# Patient Record
Sex: MALE | Race: BLACK OR AFRICAN AMERICAN | Employment: OTHER | URBAN - METROPOLITAN AREA
[De-identification: names, ages, dates, MRNs, and addresses within clinical notes are randomized per-mention and may not be internally consistent; named-entity substitution may affect disease eponyms.]

---

## 2019-05-07 ENCOUNTER — TELEPHONE (OUTPATIENT)
Dept: OTOLARYNGOLOGY | Facility: CLINIC | Age: 54
End: 2019-05-07

## 2019-05-07 ENCOUNTER — OFFICE VISIT (OUTPATIENT)
Dept: OTOLARYNGOLOGY | Facility: CLINIC | Age: 54
End: 2019-05-07
Payer: COMMERCIAL

## 2019-05-07 VITALS
DIASTOLIC BLOOD PRESSURE: 90 MMHG | TEMPERATURE: 98 F | WEIGHT: 170 LBS | SYSTOLIC BLOOD PRESSURE: 162 MMHG | HEIGHT: 68 IN | BODY MASS INDEX: 25.76 KG/M2

## 2019-05-07 DIAGNOSIS — J34.2 DEVIATED SEPTUM: Primary | ICD-10-CM

## 2019-05-07 PROCEDURE — 99212 OFFICE O/P EST SF 10 MIN: CPT | Performed by: OTOLARYNGOLOGY

## 2019-05-07 PROCEDURE — 99203 OFFICE O/P NEW LOW 30 MIN: CPT | Performed by: OTOLARYNGOLOGY

## 2019-05-07 RX ORDER — AMLODIPINE BESYLATE 5 MG/1
TABLET ORAL
COMMUNITY
Start: 2019-04-20

## 2019-05-07 NOTE — PROGRESS NOTES
Jerome Claire is a 46year old male. Patient presents with:  Nose Problem: swollen nostrils   Ear Problem: ringing in right ear     HPI:   He said difficulty breathing through his nose especially the left side.   The breathing difficulty occurs mainly at nig cervical. Posterior cervical. Supraclavicular.    Eyes Normal Conjunctiva - Right: Normal, Left: Normal. Pupil - Right: Normal, Left: Normal.    Ears Normal Inspection - Right: Normal, Left: Normal. Canal - Left: Normal. TM - Right: Normal, Left: Normal.

## 2019-05-10 NOTE — TELEPHONE ENCOUNTER
Patient contacted. Patient is scheduled for surgery with Dr Luz Mar on 06/20/19. Pre post op instructions reviewed.

## 2019-06-18 ENCOUNTER — TELEPHONE (OUTPATIENT)
Dept: OTOLARYNGOLOGY | Facility: CLINIC | Age: 54
End: 2019-06-18

## 2019-06-18 NOTE — TELEPHONE ENCOUNTER
Per [facility]:    06/18/2019 11:48 AM Michelle Mitchell - -   Note    Called [employer] and spoke to Guerita. Case is pending review. I was told that it could be another 14 business days from today as it was just placed in queue for review.               Maribel Brown

## 2019-06-26 ENCOUNTER — TELEPHONE (OUTPATIENT)
Dept: OTOLARYNGOLOGY | Facility: CLINIC | Age: 54
End: 2019-06-26

## 2019-06-26 NOTE — TELEPHONE ENCOUNTER
Called patient back. Informed prior auth for surgery has been approved. Patient states will call tomorrow to reschedule since he has work.

## 2019-06-27 NOTE — TELEPHONE ENCOUNTER
Patient contacted. Patient is scheduled for surgery with J on 07/18/19. Pre post op instructions reviewed. NSAID instructions reviewed.

## 2019-07-18 ENCOUNTER — TELEPHONE (OUTPATIENT)
Dept: OTOLARYNGOLOGY | Facility: CLINIC | Age: 54
End: 2019-07-18

## 2019-07-18 RX ORDER — HYDROCODONE BITARTRATE AND ACETAMINOPHEN 5; 325 MG/1; MG/1
TABLET ORAL
Qty: 20 TABLET | Refills: 0 | Status: SHIPPED | OUTPATIENT
Start: 2019-07-18

## 2019-07-18 RX ORDER — CEPHALEXIN 500 MG/1
500 CAPSULE ORAL EVERY 8 HOURS
Qty: 21 CAPSULE | Refills: 0 | Status: SHIPPED | OUTPATIENT
Start: 2019-07-18 | End: 2019-07-26 | Stop reason: ALTCHOICE

## 2019-07-19 ENCOUNTER — TELEPHONE (OUTPATIENT)
Dept: OTOLARYNGOLOGY | Facility: CLINIC | Age: 54
End: 2019-07-19

## 2019-07-26 ENCOUNTER — OFFICE VISIT (OUTPATIENT)
Dept: OTOLARYNGOLOGY | Facility: CLINIC | Age: 54
End: 2019-07-26
Payer: COMMERCIAL

## 2019-07-26 VITALS
SYSTOLIC BLOOD PRESSURE: 146 MMHG | TEMPERATURE: 98 F | DIASTOLIC BLOOD PRESSURE: 88 MMHG | WEIGHT: 170 LBS | BODY MASS INDEX: 25.76 KG/M2 | HEIGHT: 68 IN

## 2019-07-26 DIAGNOSIS — J34.2 DEVIATED SEPTUM: Primary | ICD-10-CM

## 2019-07-26 PROCEDURE — 99024 POSTOP FOLLOW-UP VISIT: CPT | Performed by: OTOLARYNGOLOGY

## 2019-07-28 NOTE — PROGRESS NOTES
He still very congested following his septoplasty. Exam:  Splints removed and debris cleared from nasal passages on both sides    Assessment/plan:  Doing really well. Airways patent on both sides.   Return for any problems

## 2021-04-06 ENCOUNTER — HOSPITAL ENCOUNTER (EMERGENCY)
Facility: HOSPITAL | Age: 56
Discharge: HOME | End: 2021-04-06
Attending: EMERGENCY MEDICINE
Payer: COMMERCIAL

## 2021-04-06 ENCOUNTER — APPOINTMENT (OUTPATIENT)
Dept: CT IMAGING | Facility: HOSPITAL | Age: 56
End: 2021-04-06
Attending: EMERGENCY MEDICINE
Payer: COMMERCIAL

## 2021-04-06 VITALS
OXYGEN SATURATION: 100 % | TEMPERATURE: 99 F | HEART RATE: 82 BPM | RESPIRATION RATE: 18 BRPM | DIASTOLIC BLOOD PRESSURE: 88 MMHG | SYSTOLIC BLOOD PRESSURE: 151 MMHG

## 2021-04-06 DIAGNOSIS — N20.1 URETEROLITHIASIS: Primary | ICD-10-CM

## 2021-04-06 PROCEDURE — 87086 URINE CULTURE/COLONY COUNT: CPT | Performed by: EMERGENCY MEDICINE

## 2021-04-06 PROCEDURE — 74176 CT ABD & PELVIS W/O CONTRAST: CPT | Performed by: EMERGENCY MEDICINE

## 2021-04-06 PROCEDURE — 87147 CULTURE TYPE IMMUNOLOGIC: CPT | Performed by: EMERGENCY MEDICINE

## 2021-04-06 PROCEDURE — 81001 URINALYSIS AUTO W/SCOPE: CPT

## 2021-04-06 PROCEDURE — 85025 COMPLETE CBC W/AUTO DIFF WBC: CPT | Performed by: EMERGENCY MEDICINE

## 2021-04-06 PROCEDURE — 36415 COLL VENOUS BLD VENIPUNCTURE: CPT

## 2021-04-06 PROCEDURE — 87086 URINE CULTURE/COLONY COUNT: CPT

## 2021-04-06 PROCEDURE — 85025 COMPLETE CBC W/AUTO DIFF WBC: CPT

## 2021-04-06 PROCEDURE — 81001 URINALYSIS AUTO W/SCOPE: CPT | Performed by: EMERGENCY MEDICINE

## 2021-04-06 PROCEDURE — 99284 EMERGENCY DEPT VISIT MOD MDM: CPT

## 2021-04-06 PROCEDURE — 80048 BASIC METABOLIC PNL TOTAL CA: CPT | Performed by: EMERGENCY MEDICINE

## 2021-04-06 PROCEDURE — 80048 BASIC METABOLIC PNL TOTAL CA: CPT

## 2021-04-06 RX ORDER — CEPHALEXIN 500 MG/1
500 CAPSULE ORAL 2 TIMES DAILY
Qty: 14 CAPSULE | Refills: 0 | Status: SHIPPED | OUTPATIENT
Start: 2021-04-06 | End: 2021-04-13

## 2021-04-06 RX ORDER — ONDANSETRON 4 MG/1
4 TABLET, ORALLY DISINTEGRATING ORAL EVERY 4 HOURS PRN
Qty: 10 TABLET | Refills: 0 | Status: SHIPPED | OUTPATIENT
Start: 2021-04-06 | End: 2021-04-13

## 2021-04-06 RX ORDER — HYDROCODONE BITARTRATE AND ACETAMINOPHEN 5; 325 MG/1; MG/1
1 TABLET ORAL EVERY 6 HOURS PRN
Qty: 10 TABLET | Refills: 0 | Status: SHIPPED | OUTPATIENT
Start: 2021-04-06 | End: 2021-04-13

## 2021-04-06 RX ORDER — TAMSULOSIN HYDROCHLORIDE 0.4 MG/1
0.4 CAPSULE ORAL DAILY
Qty: 7 CAPSULE | Refills: 0 | Status: SHIPPED | OUTPATIENT
Start: 2021-04-06 | End: 2021-04-13

## 2021-04-07 NOTE — ED PROVIDER NOTES
Patient Seen in: [facility] Emergency Department      History   Patient presents with:  Back Pain  Abdominal Pain    Stated Complaint: Back/ Flank Pain     HPI/Subjective:   HPI    58-year-old male with history of hypertension here with complaints Pulse 82   Resp 20   Temp 98.6 °F (37 °C)   Temp src    SpO2 100 %   O2 Device None (Room air)       Current:/88   Pulse 82   Temp 98.6 °F (37 °C)   Resp 20   SpO2 100%         Physical Exam  Vitals and nursing note reviewed.    HENT:      Head: Nor Calcium, Total 9.3 8.5 - 10.1 mg/dL    Calculated Osmolality 285 275 - 295 mOsm/kg    GFR, Non- 51 (L) >=60    GFR, -American 59 (L) >=60   URINALYSIS WITH CULTURE REFLEX    Collection Time: 04/06/21  7:21 PM    Specimen: Urine 4/6/2021  CONCLUSION:  1. There is an obstructing 0.4 cm calculus lodged at the left ureterovesical junction with resultant left-sided hydronephrosis and asymmetric left renal edema.   2. There is moderate left perinephric and periureteric fat stranding, wh days or return to ED sooner if symptoms worsen including fevers, chills, vomiting, inability to urinate, pt expresses understanding and agrees to d/c instructions    EMERGENCY DEPARTMENT MEDICAL DECISION MAKING:  After obtaining the patient's history, perf

## 2021-04-14 ENCOUNTER — HOSPITAL ENCOUNTER (OUTPATIENT)
Dept: GENERAL RADIOLOGY | Age: 56
Discharge: HOME | End: 2021-04-14
Attending: UROLOGY

## 2021-04-14 ENCOUNTER — HOSPITAL ENCOUNTER (OUTPATIENT)
Dept: ULTRASOUND IMAGING | Age: 56
Discharge: HOME | End: 2021-04-14
Attending: UROLOGY

## 2021-04-14 DIAGNOSIS — N20.0 NEPHROLITHIASIS: ICD-10-CM

## 2021-04-14 DIAGNOSIS — N20.0 CALCULUS OF KIDNEY: ICD-10-CM

## 2021-04-14 PROCEDURE — 74018 RADEX ABDOMEN 1 VIEW: CPT

## 2021-04-14 PROCEDURE — 76775 US EXAM ABDO BACK WALL LIM: CPT

## 2022-04-21 ENCOUNTER — OFFICE VISIT (OUTPATIENT)
Dept: INTERNAL MEDICINE CLINIC | Facility: CLINIC | Age: 57
End: 2022-04-21
Payer: COMMERCIAL

## 2022-04-21 VITALS
WEIGHT: 171.19 LBS | BODY MASS INDEX: 25.94 KG/M2 | RESPIRATION RATE: 18 BRPM | DIASTOLIC BLOOD PRESSURE: 86 MMHG | SYSTOLIC BLOOD PRESSURE: 128 MMHG | HEIGHT: 68 IN | TEMPERATURE: 98 F

## 2022-04-21 DIAGNOSIS — Z86.2 HISTORY OF NEUTROPENIA: ICD-10-CM

## 2022-04-21 DIAGNOSIS — Z01.89 ROUTINE LAB DRAW: ICD-10-CM

## 2022-04-21 DIAGNOSIS — I10 PRIMARY HYPERTENSION: Primary | ICD-10-CM

## 2022-04-21 PROCEDURE — 3008F BODY MASS INDEX DOCD: CPT | Performed by: INTERNAL MEDICINE

## 2022-04-21 PROCEDURE — 99204 OFFICE O/P NEW MOD 45 MIN: CPT | Performed by: INTERNAL MEDICINE

## 2022-04-21 PROCEDURE — 3079F DIAST BP 80-89 MM HG: CPT | Performed by: INTERNAL MEDICINE

## 2022-04-21 PROCEDURE — 3074F SYST BP LT 130 MM HG: CPT | Performed by: INTERNAL MEDICINE

## 2022-04-21 RX ORDER — AMLODIPINE BESYLATE 5 MG/1
5 TABLET ORAL DAILY
Qty: 90 TABLET | Refills: 3 | Status: SHIPPED | OUTPATIENT
Start: 2022-04-21

## 2022-04-25 ENCOUNTER — OFFICE VISIT (OUTPATIENT)
Dept: INTERNAL MEDICINE CLINIC | Facility: CLINIC | Age: 57
End: 2022-04-25
Payer: COMMERCIAL

## 2022-04-25 VITALS
HEIGHT: 68 IN | DIASTOLIC BLOOD PRESSURE: 80 MMHG | BODY MASS INDEX: 25.89 KG/M2 | HEART RATE: 55 BPM | OXYGEN SATURATION: 100 % | SYSTOLIC BLOOD PRESSURE: 140 MMHG | TEMPERATURE: 98 F | RESPIRATION RATE: 18 BRPM | WEIGHT: 170.81 LBS

## 2022-04-25 DIAGNOSIS — L29.9 SCALP PRURITUS: Primary | ICD-10-CM

## 2022-04-25 DIAGNOSIS — R23.8 OTHER SKIN CHANGES: ICD-10-CM

## 2022-04-25 PROCEDURE — 99213 OFFICE O/P EST LOW 20 MIN: CPT | Performed by: INTERNAL MEDICINE

## 2022-04-25 PROCEDURE — 3079F DIAST BP 80-89 MM HG: CPT | Performed by: INTERNAL MEDICINE

## 2022-04-25 PROCEDURE — 3077F SYST BP >= 140 MM HG: CPT | Performed by: INTERNAL MEDICINE

## 2022-04-25 PROCEDURE — 3008F BODY MASS INDEX DOCD: CPT | Performed by: INTERNAL MEDICINE

## 2022-04-25 RX ORDER — KETOCONAZOLE 20 MG/ML
1 SHAMPOO TOPICAL AS DIRECTED
Qty: 120 ML | Refills: 0 | Status: SHIPPED | OUTPATIENT
Start: 2022-04-25

## 2022-04-25 RX ORDER — TERBINAFINE HYDROCHLORIDE 250 MG/1
250 TABLET ORAL DAILY
Qty: 42 TABLET | Refills: 0 | Status: CANCELLED | OUTPATIENT
Start: 2022-04-25 | End: 2022-06-06

## 2022-05-17 LAB
ABSOLUTE BASOPHILS: 30 CELLS/UL (ref 0–200)
ABSOLUTE EOSINOPHILS: 129 CELLS/UL (ref 15–500)
ABSOLUTE LYMPHOCYTES: 1267 CELLS/UL (ref 850–3900)
ABSOLUTE MONOCYTES: 449 CELLS/UL (ref 200–950)
ABSOLUTE NEUTROPHILS: 1426 CELLS/UL (ref 1500–7800)
ALBUMIN/GLOBULIN RATIO: 1.6 (CALC) (ref 1–2.5)
ALBUMIN: 4.5 G/DL (ref 3.6–5.1)
ALKALINE PHOSPHATASE: 69 U/L (ref 35–144)
ALT: 16 U/L (ref 9–46)
AST: 24 U/L (ref 10–35)
BASOPHILS: 0.9 %
BILIRUBIN, TOTAL: 0.6 MG/DL (ref 0.2–1.2)
BUN: 18 MG/DL (ref 7–25)
CALCIUM: 9.5 MG/DL (ref 8.6–10.3)
CARBON DIOXIDE: 28 MMOL/L (ref 20–32)
CHLORIDE: 102 MMOL/L (ref 98–110)
CHOL/HDLC RATIO: 3.2 (CALC)
CHOLESTEROL, TOTAL: 215 MG/DL
CREATININE: 1.21 MG/DL (ref 0.7–1.33)
EGFR IF AFRICN AM: 77 ML/MIN/1.73M2
EGFR IF NONAFRICN AM: 66 ML/MIN/1.73M2
EOSINOPHILS: 3.9 %
GLOBULIN: 2.8 G/DL (CALC) (ref 1.9–3.7)
GLUCOSE: 82 MG/DL (ref 65–99)
HDL CHOLESTEROL: 67 MG/DL
HEMATOCRIT: 41.8 % (ref 38.5–50)
HEMOGLOBIN A1C: 4.9 % OF TOTAL HGB
HEMOGLOBIN: 13.5 G/DL (ref 13.2–17.1)
LDL-CHOLESTEROL: 131 MG/DL (CALC)
LYMPHOCYTES: 38.4 %
MCH: 28.7 PG (ref 27–33)
MCHC: 32.3 G/DL (ref 32–36)
MCV: 88.9 FL (ref 80–100)
MONOCYTES: 13.6 %
MPV: 10.7 FL (ref 7.5–12.5)
NEUTROPHILS: 43.2 %
NON-HDL CHOLESTEROL: 148 MG/DL (CALC)
PLATELET COUNT: 220 THOUSAND/UL (ref 140–400)
POTASSIUM: 5 MMOL/L (ref 3.5–5.3)
PROTEIN, TOTAL: 7.3 G/DL (ref 6.1–8.1)
RDW: 11.6 % (ref 11–15)
RED BLOOD CELL COUNT: 4.7 MILLION/UL (ref 4.2–5.8)
SODIUM: 137 MMOL/L (ref 135–146)
TRIGLYCERIDES: 76 MG/DL
TSH W/REFLEX TO FT4: 1.61 MIU/L (ref 0.4–4.5)
VITAMIN D, 25-OH, TOTAL: 31 NG/ML (ref 30–100)
WHITE BLOOD CELL COUNT: 3.3 THOUSAND/UL (ref 3.8–10.8)

## 2022-05-21 ENCOUNTER — HOSPITAL ENCOUNTER (OUTPATIENT)
Dept: GENERAL RADIOLOGY | Facility: HOSPITAL | Age: 57
Discharge: HOME | End: 2022-05-21
Attending: UROLOGY
Payer: COMMERCIAL

## 2022-05-21 DIAGNOSIS — N20.0 NEPHROLITHIASIS: ICD-10-CM

## 2022-05-21 PROCEDURE — 74018 RADEX ABDOMEN 1 VIEW: CPT | Performed by: UROLOGY

## 2022-06-30 ENCOUNTER — PATIENT MESSAGE (OUTPATIENT)
Dept: INTERNAL MEDICINE CLINIC | Facility: CLINIC | Age: 57
End: 2022-06-30

## 2022-06-30 NOTE — TELEPHONE ENCOUNTER
From: Humera Germain  To: Paul Mosqueda MD  Sent: 6/30/2022 9:39 AM CDT  Subject: Prescription refill    Good Morning Dr. Nadiya Morales, I made an appt with a dermatologist for the end of October. They recommended that I continue using Ketoconazole shampoo until my visit in Oct.   Can you pls call in a refill to my pharmacy? [facility] [place], [place] on [address].    Thank you,  Humera Germain

## 2022-07-01 RX ORDER — KETOCONAZOLE 20 MG/ML
1 SHAMPOO TOPICAL AS DIRECTED
Qty: 120 ML | Refills: 0 | Status: SHIPPED | OUTPATIENT
Start: 2022-07-01

## 2022-09-28 ENCOUNTER — OFFICE VISIT (OUTPATIENT)
Dept: INTERNAL MEDICINE CLINIC | Facility: CLINIC | Age: 57
End: 2022-09-28

## 2022-09-28 VITALS
HEIGHT: 68 IN | RESPIRATION RATE: 16 BRPM | HEART RATE: 80 BPM | WEIGHT: 172.19 LBS | BODY MASS INDEX: 26.1 KG/M2 | DIASTOLIC BLOOD PRESSURE: 80 MMHG | TEMPERATURE: 98 F | SYSTOLIC BLOOD PRESSURE: 130 MMHG | OXYGEN SATURATION: 98 %

## 2022-09-28 DIAGNOSIS — M54.9 LEFT-SIDED BACK PAIN, UNSPECIFIED BACK LOCATION, UNSPECIFIED CHRONICITY: Primary | ICD-10-CM

## 2022-09-28 DIAGNOSIS — Z87.442 HISTORY OF RENAL STONE: ICD-10-CM

## 2022-09-28 DIAGNOSIS — I10 PRIMARY HYPERTENSION: ICD-10-CM

## 2022-09-28 DIAGNOSIS — D72.819 LEUKOPENIA, UNSPECIFIED TYPE: ICD-10-CM

## 2022-09-28 LAB
BILIRUBIN: NEGATIVE
GLUCOSE (URINE DIPSTICK): NEGATIVE MG/DL
KETONES (URINE DIPSTICK): NEGATIVE MG/DL
LEUKOCYTES: NEGATIVE
MULTISTIX LOT#: NORMAL NUMERIC
NITRITE, URINE: NEGATIVE
OCCULT BLOOD: NEGATIVE
PH, URINE: 7.5 (ref 4.5–8)
PROTEIN (URINE DIPSTICK): NEGATIVE MG/DL
SPECIFIC GRAVITY: 1.01 (ref 1–1.03)
URINE-COLOR: YELLOW
UROBILINOGEN,SEMI-QN: 0.2 MG/DL (ref 0–1.9)

## 2022-09-28 PROCEDURE — 99214 OFFICE O/P EST MOD 30 MIN: CPT | Performed by: INTERNAL MEDICINE

## 2022-09-28 PROCEDURE — 3008F BODY MASS INDEX DOCD: CPT | Performed by: INTERNAL MEDICINE

## 2022-09-28 PROCEDURE — 81003 URINALYSIS AUTO W/O SCOPE: CPT | Performed by: INTERNAL MEDICINE

## 2022-09-28 PROCEDURE — 3075F SYST BP GE 130 - 139MM HG: CPT | Performed by: INTERNAL MEDICINE

## 2022-09-28 PROCEDURE — 3079F DIAST BP 80-89 MM HG: CPT | Performed by: INTERNAL MEDICINE

## 2022-09-28 RX ORDER — MELOXICAM 7.5 MG/1
7.5 TABLET ORAL DAILY PRN
Qty: 30 TABLET | Refills: 0 | Status: SHIPPED | OUTPATIENT
Start: 2022-09-28

## 2022-10-04 ENCOUNTER — PATIENT MESSAGE (OUTPATIENT)
Dept: INTERNAL MEDICINE CLINIC | Facility: CLINIC | Age: 57
End: 2022-10-04

## 2022-10-06 LAB
ABSOLUTE BASOPHILS: 42 CELLS/UL (ref 0–200)
ABSOLUTE EOSINOPHILS: 99 CELLS/UL (ref 15–500)
ABSOLUTE LYMPHOCYTES: 1543 CELLS/UL (ref 850–3900)
ABSOLUTE MONOCYTES: 331 CELLS/UL (ref 200–950)
ABSOLUTE NEUTROPHILS: 1786 CELLS/UL (ref 1500–7800)
BASOPHILS: 1.1 %
BUN: 19 MG/DL (ref 7–25)
CALCIUM: 9.5 MG/DL (ref 8.6–10.3)
CARBON DIOXIDE: 29 MMOL/L (ref 20–32)
CHLORIDE: 104 MMOL/L (ref 98–110)
CREATININE: 1.28 MG/DL (ref 0.7–1.3)
EGFR: 65 ML/MIN/1.73M2
EOSINOPHILS: 2.6 %
GLUCOSE: 82 MG/DL (ref 65–99)
HEMATOCRIT: 39.2 % (ref 38.5–50)
HEMOGLOBIN: 12.9 G/DL (ref 13.2–17.1)
LYMPHOCYTES: 40.6 %
MCH: 29.3 PG (ref 27–33)
MCHC: 32.9 G/DL (ref 32–36)
MCV: 89.1 FL (ref 80–100)
MONOCYTES: 8.7 %
MPV: 10.7 FL (ref 7.5–12.5)
NEUTROPHILS: 47 %
PLATELET COUNT: 236 THOUSAND/UL (ref 140–400)
POTASSIUM: 4.5 MMOL/L (ref 3.5–5.3)
RDW: 11.7 % (ref 11–15)
RED BLOOD CELL COUNT: 4.4 MILLION/UL (ref 4.2–5.8)
SODIUM: 140 MMOL/L (ref 135–146)
WHITE BLOOD CELL COUNT: 3.8 THOUSAND/UL (ref 3.8–10.8)

## 2022-10-10 DIAGNOSIS — D64.9 ANEMIA, UNSPECIFIED TYPE: Primary | ICD-10-CM

## 2022-10-13 ENCOUNTER — TELEPHONE (OUTPATIENT)
Dept: INTERNAL MEDICINE CLINIC | Facility: CLINIC | Age: 57
End: 2022-10-13

## 2022-10-13 DIAGNOSIS — D64.9 ANEMIA, UNSPECIFIED TYPE: Primary | ICD-10-CM

## 2022-10-13 NOTE — TELEPHONE ENCOUNTER
----- Message from Tita Jimenez MD sent at 10/10/2022  5:30 PM CDT -----  Results reviewed. Please inform patient that renal labs stable and wbc wnl, but now with mild anemia. Repeat cbc/anemia labs in 8 weeks; orders placed. If any new symptoms of bleeding (rectal Sherryle Law etc) then should let me know.

## 2023-01-09 ENCOUNTER — HOSPITAL ENCOUNTER (OUTPATIENT)
Dept: GENERAL RADIOLOGY | Facility: HOSPITAL | Age: 58
Discharge: HOME | End: 2023-01-09
Attending: INTERNAL MEDICINE
Payer: COMMERCIAL

## 2023-01-09 DIAGNOSIS — M54.9 LEFT-SIDED BACK PAIN, UNSPECIFIED BACK LOCATION, UNSPECIFIED CHRONICITY: ICD-10-CM

## 2023-01-09 PROCEDURE — 72114 X-RAY EXAM L-S SPINE BENDING: CPT | Performed by: INTERNAL MEDICINE

## 2023-01-13 DIAGNOSIS — D64.9 ANEMIA, UNSPECIFIED TYPE: Primary | ICD-10-CM

## 2023-01-13 LAB
% SATURATION: 42 % (CALC) (ref 20–48)
ABSOLUTE BASOPHILS: 29 CELLS/UL (ref 0–200)
ABSOLUTE EOSINOPHILS: 49 CELLS/UL (ref 15–500)
ABSOLUTE LYMPHOCYTES: 1804 CELLS/UL (ref 850–3900)
ABSOLUTE MONOCYTES: 312 CELLS/UL (ref 200–950)
ABSOLUTE NEUTROPHILS: 1907 CELLS/UL (ref 1500–7800)
BASOPHILS: 0.7 %
EOSINOPHILS: 1.2 %
FOLATE, SERUM: 13.2 NG/ML
HEMATOCRIT: 38.3 % (ref 38.5–50)
HEMOGLOBIN: 12.6 G/DL (ref 13.2–17.1)
IRON BINDING CAPACITY: 313 MCG/DL (CALC) (ref 250–425)
IRON, TOTAL: 132 MCG/DL (ref 50–180)
LYMPHOCYTES: 44 %
MCH: 29 PG (ref 27–33)
MCHC: 32.9 G/DL (ref 32–36)
MCV: 88.2 FL (ref 80–100)
MONOCYTES: 7.6 %
MPV: 10.1 FL (ref 7.5–12.5)
NEUTROPHILS: 46.5 %
PLATELET COUNT: 282 THOUSAND/UL (ref 140–400)
PSA, TOTAL: 0.57 NG/ML
RDW: 11.6 % (ref 11–15)
RED BLOOD CELL COUNT: 4.34 MILLION/UL (ref 4.2–5.8)
VITAMIN B12: 444 PG/ML (ref 200–1100)
WHITE BLOOD CELL COUNT: 4.1 THOUSAND/UL (ref 3.8–10.8)

## 2023-06-01 ENCOUNTER — HOSPITAL ENCOUNTER (OUTPATIENT)
Dept: GENERAL RADIOLOGY | Facility: HOSPITAL | Age: 58
Discharge: HOME | End: 2023-06-01
Attending: UROLOGY
Payer: COMMERCIAL

## 2023-06-01 DIAGNOSIS — N13.30 HYDRONEPHROSIS: ICD-10-CM

## 2023-06-01 PROCEDURE — 74018 RADEX ABDOMEN 1 VIEW: CPT | Performed by: UROLOGY

## (undated) NOTE — LETTER
7/26/2019          To Whom It May Concern: Hair Shea is currently under my medical care and may not return to work at this time. He may return to work on 07/30/19. Activity is restricted as follows: none.     If you require additional information